# Patient Record
(demographics unavailable — no encounter records)

---

## 2025-02-26 NOTE — ASSESSMENT
[Case reviewed with RD. Please see RD note for additional details such as lifestyle habits] : Case reviewed with RD. Please see RD note for additional details such as lifestyle habits and specific behavioral goals [FreeTextEntry1] : /// 15yo w/ class 2 severe obesity and no major comorbidities presenting for follow-up weight mangement visit, tolerating 0.5mg wegovy but with some orthostatic dizziness. Plan: - continue intensive lifestyle therapy - increase wegovy to 1mg weekly - encouraged increasing water and salt intake - fasting labs including cbc to eval orthostatic symptoms - f/u 1m RD, 3-4m with me

## 2025-02-26 NOTE — ASSESSMENT
[Case reviewed with RD. Please see RD note for additional details such as lifestyle habits] : Case reviewed with RD. Please see RD note for additional details such as lifestyle habits and specific behavioral goals [FreeTextEntry1] : /// 17yo w/ class 2 severe obesity and no major comorbidities presenting for follow-up weight mangement visit, tolerating 0.5mg wegovy but with some orthostatic dizziness. Plan: - continue intensive lifestyle therapy - increase wegovy to 1mg weekly - encouraged increasing water and salt intake - fasting labs including cbc to eval orthostatic symptoms - f/u 1m RD, 3-4m with me

## 2025-02-26 NOTE — CONSULT LETTER
[Dear  ___] : Dear  [unfilled], [Courtesy Letter:] : I had the pleasure of seeing your patient, [unfilled], in my office today. [Please see my note below.] : Please see my note below. [Consult Closing:] : Thank you very much for allowing me to participate in the care of this patient.  If you have any questions, please do not hesitate to contact me. [Sincerely,] : Sincerely, [FreeTextEntry2] : Carissa Retana  [facility]  Phone [phone] Fax [phone] [FreeTextEntry3] : Sima Desai MD, MS, FAAP Medical Director, [facility] Diplomate of the American Board of Obesity Medicine [facility] phone: [phone] General Pediatrics phone: [phone] Clinic fax: [phone]

## 2025-02-26 NOTE — CONSULT LETTER
[Dear  ___] : Dear  [unfilled], [Courtesy Letter:] : I had the pleasure of seeing your patient, [unfilled], in my office today. [Please see my note below.] : Please see my note below. [Consult Closing:] : Thank you very much for allowing me to participate in the care of this patient.  If you have any questions, please do not hesitate to contact me. [Sincerely,] : Sincerely, [FreeTextEntry2] : Carissa Retana  [facility]  Phone [phone] Fax [phone] [FreeTextEntry3] : Sima Desai MD, MS, FAAP Medical Director, [facility] Diplomate of the American Board of Obesity Medicine [facility] phone: [phone] General Pediatrics phone: [phone] Clinic fax: [phone]

## 2025-02-26 NOTE — HISTORY OF PRESENT ILLNESS
[FreeTextEntry1] : Eating more salads, feels wolfe faster Not so interested in food Still not having regular periods (but not tracking so well) Orthostatic dizziness despite drinking a lot of water, mom thinks maybe iron

## 2025-06-03 NOTE — ASSESSMENT
[Case reviewed with RD. Please see RD note for additional details such as lifestyle habits] : Case reviewed with RD. Please see RD note for additional details such as lifestyle habits and specific behavioral goals [FreeTextEntry1] : /// 17yo w/ class 2 severe obesity tolerating 1.7mg wegovy weekly with continued slow weight loss but inadequate macronutrient and fluid intake, +orthostatic dizziness. No signs of body image distortion or eating disorder, just seems busy and has nutrition knowledge deficits Plan: - continue lifestyle therapy - encouraged to eat 3 meals/day, balanced food groups, more water - RD f/u asap and me next available

## 2025-06-03 NOTE — HISTORY OF PRESENT ILLNESS
[FreeTextEntry1] : - getting ready for summer: going to [employer], lots of [place], [place] for family reunion, works at [employer] (started last month) and [employer] - RD visit last month discussed 3 meals/day, adding protein to breakfast -- has not made progress on this, still eating just lunch and dinner - has fruits and veggies some days, juice if there's apple juice - continues 1.7mg wegovy, no side effects - yesterday: L 12pm tater tots, juice, strawberries; D pork, corn, mashed potatoes - likes cucumbers, broccoli, carrots - no organized physical activity/sports - sleeping well, goes to sleeo 11:30 wakes up 6:30 - continues to have orthostatic dizziness, drinking ~40oz water per day?

## 2025-06-03 NOTE — ASSESSMENT
Pt requesting ortho referral for right shoulder pain   [Case reviewed with RD. Please see RD note for additional details such as lifestyle habits] : Case reviewed with RD. Please see RD note for additional details such as lifestyle habits and specific behavioral goals [FreeTextEntry1] : /// 15yo w/ class 2 severe obesity tolerating 1.7mg wegovy weekly with continued slow weight loss but inadequate macronutrient and fluid intake, +orthostatic dizziness. No signs of body image distortion or eating disorder, just seems busy and has nutrition knowledge deficits Plan: - continue lifestyle therapy - encouraged to eat 3 meals/day, balanced food groups, more water - RD f/u asap and me next available

## 2025-06-03 NOTE — HISTORY OF PRESENT ILLNESS
[FreeTextEntry1] : - getting ready for summer: going to [place], lots of [employer], [place] for family [place], works at [employer] (started last month) and [employer] - RD visit last month discussed 3 meals/day, adding protein to breakfast -- has not made progress on this, still eating just lunch and dinner - has fruits and veggies some days, juice if there's apple juice - continues 1.7mg wegovy, no side effects - yesterday: L 12pm tater tots, juice, strawberries; D pork, corn, mashed potatoes - likes cucumbers, broccoli, carrots - no organized physical activity/sports - sleeping well, goes to sleeo 11:30 wakes up 6:30 - continues to have orthostatic dizziness, drinking ~40oz water per day?

## 2025-06-03 NOTE — REASON FOR VISIT
[Home] : at home, [unfilled] , at the time of the visit. [Other Location: e.g. Home (Enter Location, City,State)___] : at [unfilled] [Telehealth (audio & video)] : This visit was provided via telehealth using real-time 2-way audio visual technology. [Verbal consent obtained from patient] : the patient, [unfilled] [Follow-up Weight Management] : a follow-up weight management visit [Patient] : patient